# Patient Record
Sex: FEMALE | Race: WHITE | NOT HISPANIC OR LATINO | Employment: FULL TIME | URBAN - METROPOLITAN AREA
[De-identification: names, ages, dates, MRNs, and addresses within clinical notes are randomized per-mention and may not be internally consistent; named-entity substitution may affect disease eponyms.]

---

## 2017-03-01 ENCOUNTER — RECORDS - HEALTHEAST (OUTPATIENT)
Dept: LAB | Facility: CLINIC | Age: 22
End: 2017-03-01

## 2017-03-01 LAB — HIV 1+2 AB+HIV1 P24 AG SERPL QL IA: NEGATIVE

## 2017-03-02 LAB
HBV SURFACE AG SERPL QL IA: NEGATIVE
SYPHILIS RPR SCREEN - HISTORICAL: NORMAL

## 2017-07-31 ENCOUNTER — RECORDS - HEALTHEAST (OUTPATIENT)
Dept: LAB | Facility: CLINIC | Age: 22
End: 2017-07-31

## 2017-08-01 LAB — SYPHILIS RPR SCREEN - HISTORICAL: NORMAL

## 2017-10-09 ENCOUNTER — HOSPITAL ENCOUNTER (OUTPATIENT)
Dept: OBGYN | Facility: HOSPITAL | Age: 22
Discharge: HOME | End: 2017-10-09
Attending: FAMILY MEDICINE | Admitting: FAMILY MEDICINE

## 2017-10-09 ASSESSMENT — MIFFLIN-ST. JEOR: SCORE: 1590.7

## 2017-10-24 ENCOUNTER — ANESTHESIA - HEALTHEAST (OUTPATIENT)
Dept: OBGYN | Facility: HOSPITAL | Age: 22
End: 2017-10-24

## 2017-10-26 ENCOUNTER — HOME CARE/HOSPICE - HEALTHEAST (OUTPATIENT)
Dept: HOME HEALTH SERVICES | Facility: HOME HEALTH | Age: 22
End: 2017-10-26

## 2018-02-09 ENCOUNTER — RECORDS - HEALTHEAST (OUTPATIENT)
Dept: LAB | Facility: CLINIC | Age: 23
End: 2018-02-09

## 2018-02-12 LAB
BKR LAB AP ABNORMAL BLEEDING: NO
BKR LAB AP BIRTH CONTROL/HORMONES: NORMAL
BKR LAB AP CERVICAL APPEARANCE: NORMAL
BKR LAB AP GYN ADEQUACY: NORMAL
BKR LAB AP GYN INTERPRETATION: NORMAL
BKR LAB AP HPV REFLEX: NORMAL
BKR LAB AP LMP: NORMAL
BKR LAB AP PATIENT STATUS: NORMAL
BKR LAB AP PREVIOUS ABNORMAL: NO
BKR LAB AP PREVIOUS NORMAL: NORMAL
C TRACH DNA SPEC QL PROBE+SIG AMP: NEGATIVE
HIGH RISK?: NO
PATH REPORT.COMMENTS IMP SPEC: NORMAL
RESULT FLAG (HE HISTORICAL CONVERSION): NORMAL

## 2021-05-24 ENCOUNTER — RECORDS - HEALTHEAST (OUTPATIENT)
Dept: ADMINISTRATIVE | Facility: CLINIC | Age: 26
End: 2021-05-24

## 2021-05-26 ENCOUNTER — RECORDS - HEALTHEAST (OUTPATIENT)
Dept: ADMINISTRATIVE | Facility: CLINIC | Age: 26
End: 2021-05-26

## 2021-05-31 VITALS — WEIGHT: 195 LBS | BODY MASS INDEX: 34.55 KG/M2 | HEIGHT: 63 IN

## 2021-06-13 NOTE — PROGRESS NOTES
1207- Dr Jain oksrikanth to discharge. Reviewed discharge instructions, Pt verbalized understanding. Marshall BUENROSTRO per Pt's request, okay with Dr Jain.

## 2021-06-13 NOTE — ANESTHESIA POSTPROCEDURE EVALUATION
Patient: Obey Warren  * No procedures listed *  Anesthesia type: epidural    Patient location: Labor and Delivery  Last vitals:   Vitals:    10/26/17 0300   BP: 128/76   Pulse: 88   Resp: 18   Temp: 36.7  C (98.1  F)   SpO2: 98%     Post vital signs: stable  Level of consciousness: awake and responds to simple questions  Post-anesthesia pain: pain controlled  Post-anesthesia nausea and vomiting: no  Pulmonary: unassisted, return to baseline  Cardiovascular: stable and blood pressure at baseline  Hydration: adequate  Anesthetic events: no    QCDR Measures:  ASA# 11 - Xin-op Cardiac Arrest: ASA11B - Patient did NOT experience unanticipated cardiac arrest  ASA# 12 - Xin-op Mortality Rate: ASA12B - Patient did NOT die  ASA# 13 - PACU Re-Intubation Rate: NA - No ETT / LMA used for case  ASA# 10 - Composite Anes Safety: ASA10A - No serious adverse event    Additional Notes:

## 2021-06-13 NOTE — ANESTHESIA PREPROCEDURE EVALUATION
Anesthesia Evaluation      Patient summary reviewed   No history of anesthetic complications     Airway   Mallampati: I  Neck ROM: full   Pulmonary - negative ROS and normal exam                          Cardiovascular - normal exam  Exercise tolerance: > or = 4 METS  (-) murmur  Rhythm: regular  Rate: normal,    no murmur      Neuro/Psych - negative ROS     Endo/Other    (+) pregnant     GI/Hepatic/Renal    (+) GERD well controlled,             Dental - normal exam                        Anesthesia Plan  Planned anesthetic: epidural    ASA 2     Anesthetic plan and risks discussed with: patient    Post-op plan: routine recovery

## 2021-06-13 NOTE — PROGRESS NOTES
38 6/7 weeks gestation here reporting a gush of fluid on Saturday at midnight. Pt has not felt any further leaking. Pt reports she has had some discharge. Pt oriented to room.  Pt placed on EFM.

## 2021-06-13 NOTE — ANESTHESIA PROCEDURE NOTES
Epidural Block    Patient location during procedure: OB  Time Called: 10/24/2017 11:24 AM  Reason for Block:labor epidural  Staffing:  Performing  Anesthesiologist: LUIGI REMY  Preanesthetic Checklist  Completed: patient identified, risks, benefits, and alternatives discussed, timeout performed, consent obtained, airway assessed, oxygen available, suction available, emergency drugs available and hand hygiene performed  Procedure  Patient position: right lateral decubitus  Prep: ChloraPrep  Patient monitoring: continuous pulse oximetry and blood pressure  Approach: midline  Location: L1-L2  Injection technique: OTTO saline  Number of Attempts:2  Needle  Needle type: Tavo   Needle gauge: 18 G       Assessment  Sensory level: T8  No complications

## 2021-06-16 PROBLEM — Z34.90 PREGNANT: Status: ACTIVE | Noted: 2017-10-24

## 2023-12-23 ENCOUNTER — HEALTH MAINTENANCE LETTER (OUTPATIENT)
Age: 28
End: 2023-12-23

## 2024-08-31 ENCOUNTER — HOSPITAL ENCOUNTER (EMERGENCY)
Facility: HOSPITAL | Age: 29
Discharge: HOME | End: 2024-08-31
Attending: EMERGENCY MEDICINE | Admitting: EMERGENCY MEDICINE
Payer: COMMERCIAL

## 2024-08-31 VITALS
HEART RATE: 99 BPM | OXYGEN SATURATION: 99 % | DIASTOLIC BLOOD PRESSURE: 75 MMHG | TEMPERATURE: 98.2 F | WEIGHT: 189 LBS | SYSTOLIC BLOOD PRESSURE: 143 MMHG | HEIGHT: 61 IN | RESPIRATION RATE: 12 BRPM | BODY MASS INDEX: 35.68 KG/M2

## 2024-08-31 DIAGNOSIS — R09.A2 FOREIGN BODY SENSATION, THROAT: ICD-10-CM

## 2024-08-31 PROCEDURE — 31575 DIAGNOSTIC LARYNGOSCOPY: CPT

## 2024-08-31 PROCEDURE — 250N000009 HC RX 250: Performed by: EMERGENCY MEDICINE

## 2024-08-31 PROCEDURE — 99283 EMERGENCY DEPT VISIT LOW MDM: CPT | Mod: 25

## 2024-08-31 RX ADMIN — TOPICAL ANESTHETIC 0.5 ML: 200 SPRAY DENTAL; PERIODONTAL at 12:20

## 2024-08-31 ASSESSMENT — ACTIVITIES OF DAILY LIVING (ADL)
ADLS_ACUITY_SCORE: 33
ADLS_ACUITY_SCORE: 35

## 2024-08-31 ASSESSMENT — ENCOUNTER SYMPTOMS
ABDOMINAL PAIN: 0
SHORTNESS OF BREATH: 0

## 2024-08-31 ASSESSMENT — COLUMBIA-SUICIDE SEVERITY RATING SCALE - C-SSRS
6. HAVE YOU EVER DONE ANYTHING, STARTED TO DO ANYTHING, OR PREPARED TO DO ANYTHING TO END YOUR LIFE?: NO
1. IN THE PAST MONTH, HAVE YOU WISHED YOU WERE DEAD OR WISHED YOU COULD GO TO SLEEP AND NOT WAKE UP?: NO
2. HAVE YOU ACTUALLY HAD ANY THOUGHTS OF KILLING YOURSELF IN THE PAST MONTH?: NO

## 2024-08-31 NOTE — DISCHARGE INSTRUCTIONS
Read and Follow the discharge instructions.    Sensation is when you swallow I spoke with the gastroenterologist.  He is going to follow-up next week for evaluation and endoscopy.    I Also spoke with the ear nose and throat specialist.  They are going to contact you next week if you still have the symptoms they will see you in clinic.    Eat a [place] diet.  Nothing that you have to chew on very hard.    Return if you are unable to swallow if you will have fever or worsening symptoms or any other concerns.

## 2024-08-31 NOTE — ED TRIAGE NOTES
Pt has had a fake eylash stuck in hr throat since yesterday.  She has the sensation in the middle to the right side of her throat of something in there.  She went to the urgency room yesterday and they scoped pt and the dr saw the eyelash but could not get it out.  Pt is back here today as she still can feel the eyelash.

## 2024-08-31 NOTE — ED PROVIDER NOTES
EMERGENCY DEPARTMENT ENCOUNTER      NAME: Obey Warren  AGE: 28 year old female  YOB: 1995  MRN: [id number]  EVALUATION DATE & TIME: 8/31/2024 11:35 AM    PCP: No primary care provider on file.    ED PROVIDER: Cristy Funes M.D.      CHIEF COMPLAINT     Chief Complaint   Patient presents with    Foreign Body         FINAL IMPRESSION:     1. Foreign body sensation, throat          MEDICAL DECISION MAKING:       ED Course as of 08/31/24 1509   Sat Aug 31, 2024   1504 20-year-old female presents complaining of foreign body sensation on the right side of her throat.  She use artificial eyelashes on Thursday she woke up and felt like there was something on her throat.  She went to work was able to eat but then feels continued to have the sensation when she swallows so she went to the emergency room.   1504 I reviewed external records.  Urgency room notes that the patient was scope.  There was small area in the hypopharynx that was initially seen and no longer seem that that was the patient swallowing.   1504 Patient was discharged she was told that if she notices it again to return to the emergency department.  She says she has noticed it mostly on the right side.   1504 She states she still has a sensation.  She has no difficulty breathing no cough.  Symptoms aggravated when she swallows but she is able to eat.   1505 On Examination she is well-appearing in no distress there is no stridor no muffled voice no tripoding no drooling.   1505 Verbal consent obtained.   1505 With GlideScope for angulated blade introduced with patient upright I was able to visualize the posterior pharynx.  There is no evidence of foreign body.   1506 Aspirate of her pain was done.  She had received about 5 sprays yesterday I was concerned about toxicity therefore only aspirated was done on the right side of the posterior pharynx.   1506 [place] scope was advanced.  I was able to visualize the epiglottis the cords the area  with patient has a sensation of foreign body and none were found.  GlideScope removed patient tolerated procedure well.   1506 Spoke with ENT.  Will be glad to see patient as an outpatient.  Given that patient has a sensation when she swallows spoke with GI who said to be glad to schedule patient for an endoscopy.   1507 Patient able to tolerate p.o. here.  There is no evidence of wake from from eyes.  There is no evidence of obstruction.  She is quite reliable.  She feels comfortable with this plan.  Instructed to eat a soft diet and return for any signs of fever.  Discharged melatonin stable condition.   1509 Consider admission.  Patient having no evidence of aspiration airway compromise or dysphagia.     Medical Decision Making    History:  Supplemental history from: Documented in chart  External Record(s) reviewed: Outpatient Record: [facility] 04/13/2023 Evangel[facility] DESAI 08/30/2024 Foreign Body in Larynx     Work Up:  Chart documentation includes differential considered and any EKGs or imaging independently interpreted by provider, where specified.  In additional to work up documented, I considered the following work up: Documented in chart, if applicable.    External consultation:  Discussion of management with another provider: Documented in chart, if applicable    Complicating factors:  Care impacted by chronic illness: Mental Health  Care affected by social determinants of health: N/A    Disposition considerations: Discharge. No recommendations on prescription strength medication(s). See documentation for any additional details.          External Consultation  Gastroenterology ENT      ED COURSE   11:50 AM I met the patient and performed my initial evaluation.  12:42 PM I reevaluated and updated the patient on the current care plan.  12:36 PM I spoke with Dr. Weber from ENT.  12:56 PM I spoke with Dr. Lai from GI.       At the conclusion of the encounter I discussed the results of all of the tests and  the disposition. The questions were answered. The patient and acknowledged understanding and was agreeable with the care plan.         MEDICATIONS GIVEN IN THE EMERGENCY:     Medications   benzocaine 20% (HURRICAINE/TOPEX) 20 % spray 0.5 mL (0.5 mLs Mouth/Throat $Given 8/31/24 1220)       NEW PRESCRIPTIONS STARTED AT TODAY'S ER VISIT     Discharge Medication List as of 8/31/2024  1:15 PM             =================================================================    HPI     Patient information was obtained from: Patient    Use of : N/A       Obey Warren is a 28 year old female who presents by walk-in for evaluation of a foreign body in the throat. Reports that recently they started wearing fake eyelashes, they put on fake eyelashes on Thursday (2 days ago). Yesterday, they woke up and felt something scratchy in their throat. Patient noticed that one of their fake eyelashes was missing. They presented at the [facility] and the attending found the fake eyelash with a scope, however was unable to take a photo. The attending removed the scope and stepped out to get a nurse. Upon return, the attending was unable to find the fake eyelash again. Patient was instructed to present to ED if the fake eyelash causes further problems. Patient states that the fake eyelash has caused the area of the throat to be irritated and sore. They do not feel while and are able to aspirate without difficulty.    Patient denies chest pain, shortness of breath, and abdominal pain.    Per chart review [facility] [place] 08/30/2024 for foreign body in throat.       REVIEW OF SYSTEMS   Review of Systems   Respiratory:  Negative for shortness of breath.    Cardiovascular:  Negative for chest pain.   Gastrointestinal:  Negative for abdominal pain.       PAST MEDICAL HISTORY:   No past medical history on file.    PAST SURGICAL HISTORY:     Past Surgical History:   Procedure Laterality Date    ORTHOPEDIC SURGERY      finger     "OTHER SURGICAL HISTORY  2001    tonsilectomy         CURRENT MEDICATIONS:   docusate sodium (COLACE) 100 MG capsule  ferrous sulfate 325 (65 FE) MG tablet  FLUOXETINE HCL (FLUOXETINE ORAL)  ranitidine (ZANTAC) 150 MG capsule         ALLERGIES:   No Known Allergies    FAMILY HISTORY:     Family History   Problem Relation Age of Onset    Aneurysm Maternal Grandmother     Aneurysm Paternal Grandmother        SOCIAL HISTORY:     Social History     Socioeconomic History    Marital status: Single   Tobacco Use    Smoking status: Never    Smokeless tobacco: Never   Substance and Sexual Activity    Alcohol use: No    Drug use: No    Sexual activity: Yes     Partners: Male       VITALS:   BP (!) 143/75   Pulse 99   Temp 98.2  F (36.8  C) (Tympanic)   Resp 12   Ht 1.549 m (5' 1\")   Wt 85.7 kg (189 lb)   SpO2 99%   BMI 35.71 kg/m      PHYSICAL EXAM     Physical Exam  Vitals and nursing note reviewed. Exam conducted with a chaperone present.   Constitutional:       General: She is not in acute distress.     Appearance: Normal appearance. She is normal weight. She is not ill-appearing, toxic-appearing or diaphoretic.   HENT:      Mouth/Throat:      Mouth: Mucous membranes are moist.      Pharynx: Oropharynx is clear. No oropharyngeal exudate.        Comments: No trismus no tripoding no muffled voice.  Mild Erythema right posterior pharynx.  Cardiovascular:      Rate and Rhythm: Normal rate.   Pulmonary:      Effort: Pulmonary effort is normal.   Skin:     Capillary Refill: Capillary refill takes less than 2 seconds.   Neurological:      General: No focal deficit present.      Mental Status: She is alert.           Psychiatric: Normal mood and affect. Behavior is normal.         LAB:     All pertinent labs reviewed and interpreted.  Labs Ordered and Resulted from Time of ED Arrival to Time of ED Departure - No data to display     RADIOLOGY:     Reviewed all pertinent imaging. Please see official radiology report.  No " orders to display        EKG:       I have independently reviewed and interpreted the EKG(s) documented above.      PROCEDURES:     Procedures    PROCEDURE: Fiberoptic laryngoscopy    INDICATIONS: Foreign body sensation   PROCEDURE PROVIDER: Dr Cristy Funes   MEDICATIONS: 1Topical 4% lidocaine spray    FINDINGS: I was able to visualize the uvula.  The epiglottic's.  They are epi valuables.  The vocal cords.  No foreign body noted.   COMPLICATIONS: Patient tolerated procedure well, without complication          I, Priyank Snyder, am serving as a scribe to document services personally performed by Dr. Funes based on my observation and the provider's statements to me. I, Cristy Funes MD attest that Priyank Snyder is acting in a scribe capacity, has observed my performance of the services and has documented them in accordance with my direction.    Cristy Funes M.D.  Emergency Medicine  [facility] EMERGENCY DEPARTMENT  [facility] [facility] [id number]  [phone]  Dept: [phone]       Cristy Funes MD  08/31/24 6270

## 2024-08-31 NOTE — PROGRESS NOTES
I spoke with Dr. Funes on the phone regarding this patient.  The patient is a 28-year-old female.  She was seen at the emergency room yesterday with concern for possible foreign body.  She awoke yesterday morning missing artificial eyelash.  The ED provider that saw her thought they saw the foreign body but then they were no longer able to see the foreign body.  They thought this was in the hypopharynx.  After the numbing wore off, the patient was again having symptoms and she presented to [facility] emergency department today.  They performed GlideScope evaluation and were unable to identify foreign body.    I think it is unlikely the patient swallowed the eyelash, however, even if the false eyelash were swallowed, it would not be a foreign body we would expect to cause mucosal injury or get stuck in a specific area.  It is possible her foreign body sensation is from irritation.  I am not sure I would subject patient to exam under anesthesia with rigid or flexible esophagoscopy for foreign body that should likely pass.      She is afebrile.  She is not stridulous.  She is swallowing normally.  I would recommend outpatient follow-up in 7 to 10 days for consideration of an office laryngoscopy.  If there is persistent concerns, potentially video swallow study with esophagram could be performed to assess for foreign body. If there are questions, please page me directly at [phone].    Shekhar Quiroz MD  Department of Otolaryngology-Head and Neck Surgery  [facility]

## 2024-09-03 ENCOUNTER — TELEPHONE (OUTPATIENT)
Dept: OTOLARYNGOLOGY | Facility: CLINIC | Age: 29
End: 2024-09-03
Payer: COMMERCIAL

## 2024-09-03 NOTE — TELEPHONE ENCOUNTER
"Left message with callback number to get patient scheduled.  Per Dr. Quiroz, \"Can we get this patient clinic follow up in the next 7-10 days?. She thinks she swallowed a fake eyelash. [facility] thought they saw it, seen at [facility]'s ED. They looked with [place] scope and could not see anything. Maybe repeat flex laryngoscopy to ensure there is nothing there.\"    Zina Long RN Care Coordinator, [facility] 09/03/24 9:55 AM    "

## 2024-09-04 NOTE — TELEPHONE ENCOUNTER
Spoke with patient who now has strep throat, on antibiotics.  Scheduled for next week.  She will call if symptoms improve.    Zina Long RN Care Coordinator, [facility] 09/04/24 10:16 AM

## 2024-09-09 NOTE — PROGRESS NOTES
Chief Complaint - foreign body sensation in throat    History of Present Illness - Obey Warren is a 28 year old female who presents with a history of feeling like something is in the back of the throat since 8/30/24. She went to the ER 8/30/24 and then 8/31/24 for concerns for swallowing a fake eyelash. Then went back two more times. She was strep positive and is on penicillin. She says she woke up with a foreign body in her throat and was also missing an eyelash so she felt it was an eyelash. She says the ER scoped her and they could see a black object like an eyelash, but that on second look was gone. She still feels it. It is on the right side of throat. She has a lump. She feels it is changing in size. She had throat pain. She was diagnosed with strep. She had a T&A age 5. Pain is better. She feels food gets stuck in throat. She has bad reflux. Take pepcid.     Tests personally reviewed today for this visit:   1.) 9/2/24 strep positive    Past Medical History -   Patient Active Problem List   Diagnosis    Pregnant    Anxiety and depression    Iron deficiency anemia due to chronic blood loss       Current Medications -   Current Outpatient Medications:     docusate sodium (COLACE) 100 MG capsule, [DOCUSATE SODIUM (COLACE) 100 MG CAPSULE] Take 1 capsule (100 mg total) by mouth daily., Disp: , Rfl: 0    ferrous sulfate 325 (65 FE) MG tablet, [FERROUS SULFATE 325 (65 FE) MG TABLET] Take 325 mg by mouth., Disp: , Rfl:     FLUOXETINE HCL (FLUOXETINE ORAL), [FLUOXETINE HCL (FLUOXETINE ORAL)] Take by mouth., Disp: , Rfl:     ranitidine (ZANTAC) 150 MG capsule, [RANITIDINE (ZANTAC) 150 MG CAPSULE] Take 1 capsule (150 mg total) by mouth daily., Disp: 30 capsule, Rfl: 0    Allergies - No Known Allergies    Social History -   Social History     Socioeconomic History    Marital status:    Tobacco Use    Smoking status: Never    Smokeless tobacco: Never   Substance and Sexual Activity    Alcohol use: No    Drug  use: No    Sexual activity: Yes     Partners: Male       Family History -   Family History   Problem Relation Age of Onset    Aneurysm Maternal Grandmother     Aneurysm Paternal Grandmother        Physical Exam  General - The patient is in no distress. Alert, answers questions and cooperates with examination appropriately.   Voice and Breathing - The patient was breathing comfortably without the use of accessory muscles. There was no wheezing, stridor, or stertor.  The patients voice was clear and strong.  Eyes - Extraocular movements intact.  Sclera were not icteric or injected, conjunctiva were pink and moist.  Mouth - Examination of the oral cavity showed pink, healthy oral mucosa. No lesions or ulcerations noted.  The tongue was mobile and midline.  Throat - The walls of the oropharynx were smooth, symmetric, and had no lesions or ulcerations.  The tonsillar pillars and soft palate were symmetric.  The uvula was midline on elevation. Tonsils absent.   Nose - External contour is symmetric, no gross deflection or scars.  Nasal mucosa is pink and moist with no abnormal mucus.  The septum was midline and non-obstructive, turbinates of normal size and position.  No polyps, masses, or purulence noted on examination.  Neck -  Soft, non-tender. Palpation of the occipital, submental, submandibular, internal jugular chain, and supraclavicular nodes did not demonstrate any abnormal lymph nodes or masses. No parotid masses. Palpation of the thyroid was soft and smooth, with no nodules or goiter appreciated.  The trachea was mobile and midline.  Neurologic - CN II-XII are grossly intact, no focal neurologic deficits.       Procedure: Flexible Endoscopy  Indication: Globus Sensation    Attempts at mirror laryngoscopy could not be performed due to gag reflex and patient anatomy. To best visualize the upper airway anatomy and due to the chief complaint and HPI, I proceeded with a fiberoptic examination. Verbal consent was  obtained for this procedure, and the patient agreed and wished to proceed. Color photographs were taken for the medical record.     First I sprayed the right side of the nose with a mixture of lidocaine and neosynephrine.  I then passed the scope through the nasal cavity.  The nasal cavity was unremarkable. The nasopharynx was mucosally covered and symmetric.  The Eustachian tube openings were unobstructed.  Going further down I had a clear view of the base of tongue which had normal appearing lingual tonsillar tissue. The base of tongue was free of lesions, masses, and the vallecula was open. The epiglottis was smooth and mucosally covered. The supraglottic larynx was then clearly visualized. It was normal. The vocal cords moved smoothly and symmetrically, they were pearly white and no lesions were seen. The pyriform sinuses were open, and a view of the postcricoid region did not show any lesions or foreign bodies. I see no foreign bodies and I looked very closely at the pyriforms and post-cricoid area.       A/P -     ICD-10-CM    1. Globus sensation  R09.A2 omeprazole 20 MG tablet     LARYNGOSCOPY FLEX FIBEROPTIC, DIAGNOSTIC      2. LPRD (laryngopharyngeal reflux disease)  K21.9 omeprazole 20 MG tablet     LARYNGOSCOPY FLEX FIBEROPTIC, DIAGNOSTIC          Obey Warren is a 28 year old female with globus sensation but no evidence of infection, inflammation, or neoplasm or foreign body on exam to explain the symptoms. It's possible she had an eye lash foreign body, but at this point I don't see it and it has passed. Other possibilities include strep, and also she has uncontrolled reflux which is the most likely cause. She doesn't take her prilose regularly. I have started PPIs (prilosec 20 mg every day and stressed she must take this daily) and provided counseling on lifestyle changes including avoidance of certain foods, sleeping on an incline, and avoiding lying down within 3-4 hours after eating.      Terence  Jose J MAHONEY  Otolaryngology  [facility]

## 2024-09-11 ENCOUNTER — OFFICE VISIT (OUTPATIENT)
Dept: OTOLARYNGOLOGY | Facility: CLINIC | Age: 29
End: 2024-09-11
Payer: COMMERCIAL

## 2024-09-11 DIAGNOSIS — K21.9 LPRD (LARYNGOPHARYNGEAL REFLUX DISEASE): ICD-10-CM

## 2024-09-11 DIAGNOSIS — R09.A2 GLOBUS SENSATION: Primary | ICD-10-CM

## 2024-09-11 PROCEDURE — 31575 DIAGNOSTIC LARYNGOSCOPY: CPT | Performed by: OTOLARYNGOLOGY

## 2024-09-11 PROCEDURE — 99203 OFFICE O/P NEW LOW 30 MIN: CPT | Mod: 25 | Performed by: OTOLARYNGOLOGY

## 2024-09-11 RX ORDER — NICOTINE POLACRILEX 4 MG/1
20 GUM, CHEWING ORAL DAILY
Qty: 30 TABLET | Refills: 11 | Status: SHIPPED | OUTPATIENT
Start: 2024-09-11

## 2024-09-11 NOTE — LETTER
9/11/2024      Obey Warren  [address] [facility] [address]      Dear Colleague,    Thank you for referring your patient, Obey Warren, to the [facility]. Please see a copy of my visit note below.    Chief Complaint - foreign body sensation in throat    History of Present Illness - Obey Warren is a 28 year old female who presents with a history of feeling like something is in the back of the throat since 8/30/24. She went to the ER 8/30/24 and then 8/31/24 for concerns for swallowing a fake eyelash. Then went back two more times. She was strep positive and is on penicillin. She says she woke up with a foreign body in her throat and was also missing an eyelash so she felt it was an eyelash. She says the ER scoped her and they could see a black object like an eyelash, but that on second look was gone. She still feels it. It is on the right side of throat. She has a lump. She feels it is changing in size. She had throat pain. She was diagnosed with strep. She had a T&A age 5. Pain is better. She feels food gets stuck in throat. She has bad reflux. Take pepcid.     Tests personally reviewed today for this visit:   1.) 9/2/24 strep positive    Past Medical History -   Patient Active Problem List   Diagnosis     Pregnant     Anxiety and depression     Iron deficiency anemia due to chronic blood loss       Current Medications -   Current Outpatient Medications:      docusate sodium (COLACE) 100 MG capsule, [DOCUSATE SODIUM (COLACE) 100 MG CAPSULE] Take 1 capsule (100 mg total) by mouth daily., Disp: , Rfl: 0     ferrous sulfate 325 (65 FE) MG tablet, [FERROUS SULFATE 325 (65 FE) MG TABLET] Take 325 mg by mouth., Disp: , Rfl:      FLUOXETINE HCL (FLUOXETINE ORAL), [FLUOXETINE HCL (FLUOXETINE ORAL)] Take by mouth., Disp: , Rfl:      ranitidine (ZANTAC) 150 MG capsule, [RANITIDINE (ZANTAC) 150 MG CAPSULE] Take 1 capsule (150 mg total) by mouth daily., Disp: 30 capsule, Rfl:  0    Allergies - No Known Allergies    Social History -   Social History     Socioeconomic History     Marital status:    Tobacco Use     Smoking status: Never     Smokeless tobacco: Never   Substance and Sexual Activity     Alcohol use: No     Drug use: No     Sexual activity: Yes     Partners: Male       Family History -   Family History   Problem Relation Age of Onset     Aneurysm Maternal Grandmother      Aneurysm Paternal Grandmother        Physical Exam  General - The patient is in no distress. Alert, answers questions and cooperates with examination appropriately.   Voice and Breathing - The patient was breathing comfortably without the use of accessory muscles. There was no wheezing, stridor, or stertor.  The patients voice was clear and strong.  Eyes - Extraocular movements intact.  Sclera were not icteric or injected, conjunctiva were pink and moist.  Mouth - Examination of the oral cavity showed pink, healthy oral mucosa. No lesions or ulcerations noted.  The tongue was mobile and midline.  Throat - The walls of the oropharynx were smooth, symmetric, and had no lesions or ulcerations.  The tonsillar pillars and soft palate were symmetric.  The uvula was midline on elevation. Tonsils absent.   Nose - External contour is symmetric, no gross deflection or scars.  Nasal mucosa is pink and moist with no abnormal mucus.  The septum was midline and non-obstructive, turbinates of normal size and position.  No polyps, masses, or purulence noted on examination.  Neck -  Soft, non-tender. Palpation of the occipital, submental, submandibular, internal jugular chain, and supraclavicular nodes did not demonstrate any abnormal lymph nodes or masses. No parotid masses. Palpation of the thyroid was soft and smooth, with no nodules or goiter appreciated.  The trachea was mobile and midline.  Neurologic - CN II-XII are grossly intact, no focal neurologic deficits.       Procedure: Flexible Endoscopy  Indication:  Globus Sensation    Attempts at mirror laryngoscopy could not be performed due to gag reflex and patient anatomy. To best visualize the upper airway anatomy and due to the chief complaint and HPI, I proceeded with a fiberoptic examination. Verbal consent was obtained for this procedure, and the patient agreed and wished to proceed. Color photographs were taken for the medical record.     First I sprayed the right side of the nose with a mixture of lidocaine and neosynephrine.  I then passed the scope through the nasal cavity.  The nasal cavity was unremarkable. The nasopharynx was mucosally covered and symmetric.  The Eustachian tube openings were unobstructed.  Going further down I had a clear view of the base of tongue which had normal appearing lingual tonsillar tissue. The base of tongue was free of lesions, masses, and the vallecula was open. The epiglottis was smooth and mucosally covered. The supraglottic larynx was then clearly visualized. It was normal. The vocal cords moved smoothly and symmetrically, they were pearly white and no lesions were seen. The pyriform sinuses were open, and a view of the postcricoid region did not show any lesions or foreign bodies. I see no foreign bodies and I looked very closely at the pyriforms and post-cricoid area.       A/P -     ICD-10-CM    1. Globus sensation  R09.A2 omeprazole 20 MG tablet     LARYNGOSCOPY FLEX FIBEROPTIC, DIAGNOSTIC      2. LPRD (laryngopharyngeal reflux disease)  K21.9 omeprazole 20 MG tablet     LARYNGOSCOPY FLEX FIBEROPTIC, DIAGNOSTIC          Obey Warren is a 28 year old female with globus sensation but no evidence of infection, inflammation, or neoplasm or foreign body on exam to explain the symptoms. It's possible she had an eye lash foreign body, but at this point I don't see it and it has passed. Other possibilities include strep, and also she has uncontrolled reflux which is the most likely cause. She doesn't take her prilose regularly.  I have started PPIs (prilosec 20 mg every day and stressed she must take this daily) and provided counseling on lifestyle changes including avoidance of certain foods, sleeping on an incline, and avoiding lying down within 3-4 hours after eating.      Terence Lux MD  Otolaryngology  [facility]        Again, thank you for allowing me to participate in the care of your patient.        Sincerely,        Terence Lux MD

## 2025-01-18 ENCOUNTER — HEALTH MAINTENANCE LETTER (OUTPATIENT)
Age: 30
End: 2025-01-18

## 2025-06-26 ENCOUNTER — ANCILLARY PROCEDURE (OUTPATIENT)
Dept: ULTRASOUND IMAGING | Facility: CLINIC | Age: 30
End: 2025-06-26
Payer: COMMERCIAL

## 2025-06-26 ENCOUNTER — TELEPHONE (OUTPATIENT)
Dept: NURSING | Facility: CLINIC | Age: 30
End: 2025-06-26

## 2025-06-26 ENCOUNTER — OFFICE VISIT (OUTPATIENT)
Dept: MIDWIFE SERVICES | Facility: CLINIC | Age: 30
End: 2025-06-26
Payer: COMMERCIAL

## 2025-06-26 VITALS
BODY MASS INDEX: 35.71 KG/M2 | WEIGHT: 189 LBS | SYSTOLIC BLOOD PRESSURE: 119 MMHG | DIASTOLIC BLOOD PRESSURE: 74 MMHG | HEART RATE: 89 BPM

## 2025-06-26 DIAGNOSIS — Z32.01 POSITIVE PREGNANCY TEST: Primary | ICD-10-CM

## 2025-06-26 DIAGNOSIS — Z32.01 POSITIVE PREGNANCY TEST: ICD-10-CM

## 2025-06-26 DIAGNOSIS — Z32.01 PREGNANCY TEST POSITIVE: Primary | ICD-10-CM

## 2025-06-26 LAB — HCG UR QL: POSITIVE

## 2025-06-26 RX ORDER — FAMOTIDINE 10 MG
10 TABLET ORAL
COMMUNITY

## 2025-06-26 RX ORDER — LEVONORGESTREL 52 MG/1
1 INTRAUTERINE DEVICE INTRAUTERINE ONCE
COMMUNITY

## 2025-06-26 NOTE — TELEPHONE ENCOUNTER
RN called     Mirena IUD, placed about 8 years December 2017 or January 2018   Has not had any bleeding since getting the IUD  Started spotting 2 days ago, dark brown, no cramping   Took a UPT this AM, positive  Is not sure if she wants to continue pregnancy or not, found out this AM and got tearful/feeling overwhelmed.     She is booked for IUD removal appt today, also US to determine pregnancy location (will need US for MTOP if she decides that)     Staff message sent to provider ptn is seeing.     US order placed per protocol    Saida SU RN   [facility] Triage RN